# Patient Record
Sex: FEMALE | Race: WHITE | Employment: OTHER
[De-identification: names, ages, dates, MRNs, and addresses within clinical notes are randomized per-mention and may not be internally consistent; named-entity substitution may affect disease eponyms.]

---

## 2024-07-11 ENCOUNTER — OFFICE VISIT (OUTPATIENT)
Dept: FAMILY MEDICINE CLINIC | Facility: CLINIC | Age: 89
End: 2024-07-11
Payer: MEDICARE

## 2024-07-11 VITALS
SYSTOLIC BLOOD PRESSURE: 116 MMHG | WEIGHT: 110.4 LBS | BODY MASS INDEX: 21.68 KG/M2 | OXYGEN SATURATION: 98 % | TEMPERATURE: 97.4 F | HEIGHT: 60 IN | RESPIRATION RATE: 14 BRPM | HEART RATE: 94 BPM | DIASTOLIC BLOOD PRESSURE: 62 MMHG

## 2024-07-11 DIAGNOSIS — M62.81 MUSCLE RIGHT ARM WEAKNESS: ICD-10-CM

## 2024-07-11 DIAGNOSIS — Z13.220 SCREENING FOR LIPOID DISORDERS: ICD-10-CM

## 2024-07-11 DIAGNOSIS — Z85.038 HISTORY OF COLON CANCER: Primary | ICD-10-CM

## 2024-07-11 DIAGNOSIS — R73.03 PREDIABETES: ICD-10-CM

## 2024-07-11 DIAGNOSIS — Z85.828 HISTORY OF SKIN CANCER IN ADULTHOOD: ICD-10-CM

## 2024-07-11 DIAGNOSIS — G89.29 CHRONIC RIGHT SHOULDER PAIN: ICD-10-CM

## 2024-07-11 DIAGNOSIS — E55.9 VITAMIN D DEFICIENCY: ICD-10-CM

## 2024-07-11 DIAGNOSIS — M25.511 CHRONIC RIGHT SHOULDER PAIN: ICD-10-CM

## 2024-07-11 DIAGNOSIS — E83.42 HYPOMAGNESEMIA: ICD-10-CM

## 2024-07-11 PROCEDURE — 1123F ACP DISCUSS/DSCN MKR DOCD: CPT | Performed by: NURSE PRACTITIONER

## 2024-07-11 PROCEDURE — 99204 OFFICE O/P NEW MOD 45 MIN: CPT | Performed by: NURSE PRACTITIONER

## 2024-07-11 RX ORDER — LATANOPROST 50 UG/ML
1 SOLUTION/ DROPS OPHTHALMIC DAILY
COMMUNITY
Start: 2024-06-21

## 2024-07-11 SDOH — ECONOMIC STABILITY: FOOD INSECURITY: WITHIN THE PAST 12 MONTHS, THE FOOD YOU BOUGHT JUST DIDN'T LAST AND YOU DIDN'T HAVE MONEY TO GET MORE.: NEVER TRUE

## 2024-07-11 SDOH — ECONOMIC STABILITY: HOUSING INSECURITY
IN THE LAST 12 MONTHS, WAS THERE A TIME WHEN YOU DID NOT HAVE A STEADY PLACE TO SLEEP OR SLEPT IN A SHELTER (INCLUDING NOW)?: NO

## 2024-07-11 SDOH — ECONOMIC STABILITY: FOOD INSECURITY: WITHIN THE PAST 12 MONTHS, YOU WORRIED THAT YOUR FOOD WOULD RUN OUT BEFORE YOU GOT MONEY TO BUY MORE.: NEVER TRUE

## 2024-07-11 SDOH — ECONOMIC STABILITY: INCOME INSECURITY: HOW HARD IS IT FOR YOU TO PAY FOR THE VERY BASICS LIKE FOOD, HOUSING, MEDICAL CARE, AND HEATING?: NOT HARD AT ALL

## 2024-07-11 ASSESSMENT — PATIENT HEALTH QUESTIONNAIRE - PHQ9
SUM OF ALL RESPONSES TO PHQ QUESTIONS 1-9: 0
SUM OF ALL RESPONSES TO PHQ QUESTIONS 1-9: 0
2. FEELING DOWN, DEPRESSED OR HOPELESS: NOT AT ALL
SUM OF ALL RESPONSES TO PHQ QUESTIONS 1-9: 0
SUM OF ALL RESPONSES TO PHQ9 QUESTIONS 1 & 2: 0
1. LITTLE INTEREST OR PLEASURE IN DOING THINGS: NOT AT ALL
SUM OF ALL RESPONSES TO PHQ QUESTIONS 1-9: 0
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
## 2024-07-12 ENCOUNTER — LAB (OUTPATIENT)
Dept: FAMILY MEDICINE CLINIC | Facility: CLINIC | Age: 89
End: 2024-07-12
Payer: MEDICARE

## 2024-07-12 DIAGNOSIS — R73.03 PREDIABETES: ICD-10-CM

## 2024-07-12 DIAGNOSIS — Z11.59 NEED FOR HEPATITIS C SCREENING TEST: ICD-10-CM

## 2024-07-12 DIAGNOSIS — E55.9 VITAMIN D DEFICIENCY: ICD-10-CM

## 2024-07-12 DIAGNOSIS — E83.42 HYPOMAGNESEMIA: ICD-10-CM

## 2024-07-12 DIAGNOSIS — Z85.038 HISTORY OF COLON CANCER: ICD-10-CM

## 2024-07-12 DIAGNOSIS — Z13.220 SCREENING FOR LIPOID DISORDERS: ICD-10-CM

## 2024-07-12 LAB
25(OH)D3 SERPL-MCNC: 34.8 NG/ML (ref 30–100)
ALBUMIN SERPL-MCNC: 3.7 G/DL (ref 3.2–4.6)
ALBUMIN/GLOB SERPL: 1 (ref 1–1.9)
ALP SERPL-CCNC: 83 U/L (ref 35–104)
ALT SERPL-CCNC: 20 U/L (ref 12–65)
ANION GAP SERPL CALC-SCNC: 10 MMOL/L (ref 9–18)
AST SERPL-CCNC: 28 U/L (ref 15–37)
BASOPHILS # BLD: 0.1 K/UL (ref 0–0.2)
BASOPHILS NFR BLD: 1 % (ref 0–2)
BILIRUB SERPL-MCNC: 0.5 MG/DL (ref 0–1.2)
BILIRUBIN, URINE, POC: NEGATIVE
BLOOD URINE, POC: ABNORMAL
BUN SERPL-MCNC: 25 MG/DL (ref 8–23)
CALCIUM SERPL-MCNC: 9.6 MG/DL (ref 8.8–10.2)
CHLORIDE SERPL-SCNC: 105 MMOL/L (ref 98–107)
CHOLEST SERPL-MCNC: 209 MG/DL (ref 0–200)
CO2 SERPL-SCNC: 26 MMOL/L (ref 20–28)
CREAT SERPL-MCNC: 0.84 MG/DL (ref 0.6–1.1)
DIFFERENTIAL METHOD BLD: ABNORMAL
EOSINOPHIL # BLD: 0.3 K/UL (ref 0–0.8)
EOSINOPHIL NFR BLD: 4 % (ref 0.5–7.8)
ERYTHROCYTE [DISTWIDTH] IN BLOOD BY AUTOMATED COUNT: 14.2 % (ref 11.9–14.6)
EST. AVERAGE GLUCOSE BLD GHB EST-MCNC: 216 MG/DL
GLOBULIN SER CALC-MCNC: 3.7 G/DL (ref 2.3–3.5)
GLUCOSE SERPL-MCNC: 105 MG/DL (ref 70–99)
GLUCOSE URINE, POC: NEGATIVE
HBA1C MFR BLD: 9.1 % (ref 0–5.6)
HCT VFR BLD AUTO: 46.1 % (ref 35.8–46.3)
HDLC SERPL-MCNC: 72 MG/DL (ref 40–60)
HDLC SERPL: 2.9 (ref 0–5)
HGB BLD-MCNC: 14.2 G/DL (ref 11.7–15.4)
IMM GRANULOCYTES # BLD AUTO: 0 K/UL (ref 0–0.5)
IMM GRANULOCYTES NFR BLD AUTO: 0 % (ref 0–5)
KETONES, URINE, POC: NEGATIVE
LDLC SERPL CALC-MCNC: 120 MG/DL (ref 0–100)
LEUKOCYTE ESTERASE, URINE, POC: NEGATIVE
LYMPHOCYTES # BLD: 1.8 K/UL (ref 0.5–4.6)
LYMPHOCYTES NFR BLD: 24 % (ref 13–44)
MAGNESIUM SERPL-MCNC: 1.9 MG/DL (ref 1.8–2.4)
MCH RBC QN AUTO: 29 PG (ref 26.1–32.9)
MCHC RBC AUTO-ENTMCNC: 30.8 G/DL (ref 31.4–35)
MCV RBC AUTO: 94.1 FL (ref 82–102)
MONOCYTES # BLD: 0.8 K/UL (ref 0.1–1.3)
MONOCYTES NFR BLD: 10 % (ref 4–12)
NEUTS SEG # BLD: 4.7 K/UL (ref 1.7–8.2)
NEUTS SEG NFR BLD: 62 % (ref 43–78)
NITRITE, URINE, POC: NEGATIVE
NRBC # BLD: 0 K/UL (ref 0–0.2)
PH, URINE, POC: 6 (ref 4.6–8)
PLATELET # BLD AUTO: 260 K/UL (ref 150–450)
PMV BLD AUTO: 10.1 FL (ref 9.4–12.3)
POTASSIUM SERPL-SCNC: 4.1 MMOL/L (ref 3.5–5.1)
PROT SERPL-MCNC: 7.4 G/DL (ref 6.3–8.2)
PROTEIN,URINE, POC: NEGATIVE
RBC # BLD AUTO: 4.9 M/UL (ref 4.05–5.2)
SODIUM SERPL-SCNC: 141 MMOL/L (ref 136–145)
SPECIFIC GRAVITY, URINE, POC: 1.02 (ref 1–1.03)
TRIGL SERPL-MCNC: 90 MG/DL (ref 0–150)
TSH W FREE THYROID IF ABNORMAL: 1.68 UIU/ML (ref 0.27–4.2)
URINALYSIS CLARITY, POC: ABNORMAL
URINALYSIS COLOR, POC: YELLOW
UROBILINOGEN, POC: NORMAL
VLDLC SERPL CALC-MCNC: 18 MG/DL (ref 6–23)
WBC # BLD AUTO: 7.6 K/UL (ref 4.3–11.1)

## 2024-07-12 PROCEDURE — 81003 URINALYSIS AUTO W/O SCOPE: CPT | Performed by: NURSE PRACTITIONER

## 2024-07-13 DIAGNOSIS — E11.9 TYPE 2 DIABETES MELLITUS WITHOUT COMPLICATION, WITHOUT LONG-TERM CURRENT USE OF INSULIN (HCC): Primary | ICD-10-CM

## 2024-07-13 RX ORDER — GLIPIZIDE AND METFORMIN HCL 2.5; 5 MG/1; MG/1
1 TABLET, FILM COATED ORAL 2 TIMES DAILY WITH MEALS
Qty: 180 TABLET | Refills: 1 | Status: SHIPPED | OUTPATIENT
Start: 2024-07-13

## 2024-07-15 ENCOUNTER — TELEPHONE (OUTPATIENT)
Dept: FAMILY MEDICINE CLINIC | Facility: CLINIC | Age: 89
End: 2024-07-15

## 2024-07-15 DIAGNOSIS — E11.9 TYPE 2 DIABETES MELLITUS WITHOUT COMPLICATION, WITHOUT LONG-TERM CURRENT USE OF INSULIN (HCC): Primary | ICD-10-CM

## 2024-07-15 NOTE — TELEPHONE ENCOUNTER
Spoke with patient regarding lab work and diabetes, she needs a referral to nutritionist to help educate her.

## 2024-07-19 ASSESSMENT — ENCOUNTER SYMPTOMS
RESPIRATORY NEGATIVE: 1
ABDOMINAL DISTENTION: 0
VOMITING: 0
PHOTOPHOBIA: 0
RECTAL PAIN: 0
COUGH: 0
NAUSEA: 0
ALLERGIC/IMMUNOLOGIC NEGATIVE: 1
APNEA: 0
CHOKING: 0
SORE THROAT: 0
DIARRHEA: 0
RHINORRHEA: 0
SINUS PAIN: 0
BLOOD IN STOOL: 0
STRIDOR: 0
WHEEZING: 0
COLOR CHANGE: 0
EYES NEGATIVE: 1
VOICE CHANGE: 0
SHORTNESS OF BREATH: 0
ABDOMINAL PAIN: 0
EYE ITCHING: 0
CHEST TIGHTNESS: 0
CONSTIPATION: 0
EYE REDNESS: 0
SINUS PRESSURE: 0
EYE DISCHARGE: 0
TROUBLE SWALLOWING: 0
BACK PAIN: 0
FACIAL SWELLING: 0
GASTROINTESTINAL NEGATIVE: 1
ANAL BLEEDING: 0
EYE PAIN: 0

## 2024-07-22 ENCOUNTER — TELEPHONE (OUTPATIENT)
Dept: FAMILY MEDICINE CLINIC | Facility: CLINIC | Age: 89
End: 2024-07-22

## 2024-07-22 NOTE — TELEPHONE ENCOUNTER
Patient called stating that she had to stop taking the diabetic medication due to the fact the pharmacist gave her two bottles so she thought she had to take 1 of each bottle, hence causing major stomach up set. She now has an appointment with nutritionalist on the 8/9 and has had some help at [employer] by a [employer] employee taking her around to help her pick healthy selections.  She is requesting a glucometer I let her know that at her next appointment that you and her would talk about it.

## 2024-07-23 RX ORDER — BLOOD-GLUCOSE METER
1 KIT MISCELLANEOUS 2 TIMES DAILY
Qty: 1 KIT | Refills: 0 | Status: SHIPPED | OUTPATIENT
Start: 2024-07-23 | End: 2024-07-23 | Stop reason: SDUPTHER

## 2024-07-23 RX ORDER — BLOOD-GLUCOSE METER
1 KIT MISCELLANEOUS DAILY
Qty: 1 KIT | Refills: 0 | Status: SHIPPED | OUTPATIENT
Start: 2024-07-23

## 2024-07-23 RX ORDER — BLOOD-GLUCOSE METER
1 KIT MISCELLANEOUS 2 TIMES DAILY
COMMUNITY
End: 2024-07-23 | Stop reason: SDUPTHER

## 2024-07-23 RX ORDER — LANCETS 30 GAUGE
1 EACH MISCELLANEOUS DAILY
Qty: 100 EACH | Refills: 5 | Status: SHIPPED | OUTPATIENT
Start: 2024-07-23

## 2024-07-31 DIAGNOSIS — Z85.038 HISTORY OF COLON CANCER: Primary | ICD-10-CM

## 2024-08-01 ENCOUNTER — HOSPITAL ENCOUNTER (OUTPATIENT)
Dept: LAB | Age: 89
Discharge: HOME | End: 2024-08-01
Payer: MEDICARE

## 2024-08-01 ENCOUNTER — OFFICE VISIT (OUTPATIENT)
Dept: ONCOLOGY | Age: 89
End: 2024-08-01

## 2024-08-01 VITALS
HEIGHT: 60 IN | SYSTOLIC BLOOD PRESSURE: 113 MMHG | RESPIRATION RATE: 16 BRPM | BODY MASS INDEX: 21.52 KG/M2 | OXYGEN SATURATION: 98 % | WEIGHT: 109.6 LBS | DIASTOLIC BLOOD PRESSURE: 70 MMHG | HEART RATE: 75 BPM | TEMPERATURE: 97.8 F

## 2024-08-01 DIAGNOSIS — G62.0 CHEMOTHERAPY-INDUCED NEUROPATHY (HCC): ICD-10-CM

## 2024-08-01 DIAGNOSIS — Z85.038 HISTORY OF COLON CANCER: Primary | ICD-10-CM

## 2024-08-01 DIAGNOSIS — T45.1X5A CHEMOTHERAPY-INDUCED NEUROPATHY (HCC): ICD-10-CM

## 2024-08-01 DIAGNOSIS — Z85.038 HISTORY OF COLON CANCER: ICD-10-CM

## 2024-08-01 DIAGNOSIS — R97.0 ELEVATED CEA: ICD-10-CM

## 2024-08-01 LAB
ALBUMIN SERPL-MCNC: 3.6 G/DL (ref 3.2–4.6)
ALBUMIN/GLOB SERPL: 1 (ref 1–1.9)
ALP SERPL-CCNC: 79 U/L (ref 35–104)
ALT SERPL-CCNC: 28 U/L (ref 12–65)
ANION GAP SERPL CALC-SCNC: 10 MMOL/L (ref 9–18)
AST SERPL-CCNC: 35 U/L (ref 15–37)
BASOPHILS # BLD: 0.1 K/UL (ref 0–0.2)
BASOPHILS NFR BLD: 1 % (ref 0–2)
BILIRUB SERPL-MCNC: 0.3 MG/DL (ref 0–1.2)
BUN SERPL-MCNC: 36 MG/DL (ref 8–23)
CALCIUM SERPL-MCNC: 9.4 MG/DL (ref 8.8–10.2)
CEA SERPL-MCNC: 3.9 NG/ML (ref 0–3.8)
CHLORIDE SERPL-SCNC: 103 MMOL/L (ref 98–107)
CO2 SERPL-SCNC: 26 MMOL/L (ref 20–28)
CREAT SERPL-MCNC: 0.79 MG/DL (ref 0.6–1.1)
DIFFERENTIAL METHOD BLD: ABNORMAL
EOSINOPHIL # BLD: 0.2 K/UL (ref 0–0.8)
EOSINOPHIL NFR BLD: 2 % (ref 0.5–7.8)
ERYTHROCYTE [DISTWIDTH] IN BLOOD BY AUTOMATED COUNT: 13.8 % (ref 11.9–14.6)
GLOBULIN SER CALC-MCNC: 3.6 G/DL (ref 2.3–3.5)
GLUCOSE SERPL-MCNC: 101 MG/DL (ref 70–99)
HCT VFR BLD AUTO: 43.4 % (ref 35.8–46.3)
HGB BLD-MCNC: 14.3 G/DL (ref 11.7–15.4)
IMM GRANULOCYTES # BLD AUTO: 0 K/UL (ref 0–0.5)
IMM GRANULOCYTES NFR BLD AUTO: 0 % (ref 0–5)
LYMPHOCYTES # BLD: 1.8 K/UL (ref 0.5–4.6)
LYMPHOCYTES NFR BLD: 19 % (ref 13–44)
MCH RBC QN AUTO: 29.6 PG (ref 26.1–32.9)
MCHC RBC AUTO-ENTMCNC: 32.9 G/DL (ref 31.4–35)
MCV RBC AUTO: 89.9 FL (ref 82–102)
MONOCYTES # BLD: 0.8 K/UL (ref 0.1–1.3)
MONOCYTES NFR BLD: 9 % (ref 4–12)
NEUTS SEG # BLD: 6.5 K/UL (ref 1.7–8.2)
NEUTS SEG NFR BLD: 69 % (ref 43–78)
NRBC # BLD: 0 K/UL (ref 0–0.2)
PLATELET # BLD AUTO: 249 K/UL (ref 150–450)
PMV BLD AUTO: 9.3 FL (ref 9.4–12.3)
POTASSIUM SERPL-SCNC: 4.8 MMOL/L (ref 3.5–5.1)
PROT SERPL-MCNC: 7.3 G/DL (ref 6.3–8.2)
RBC # BLD AUTO: 4.83 M/UL (ref 4.05–5.2)
SODIUM SERPL-SCNC: 139 MMOL/L (ref 136–145)
WBC # BLD AUTO: 9.3 K/UL (ref 4.3–11.1)

## 2024-08-01 PROCEDURE — 85025 COMPLETE CBC W/AUTO DIFF WBC: CPT

## 2024-08-01 PROCEDURE — 80053 COMPREHEN METABOLIC PANEL: CPT

## 2024-08-01 PROCEDURE — 82378 CARCINOEMBRYONIC ANTIGEN: CPT

## 2024-08-01 PROCEDURE — 36415 COLL VENOUS BLD VENIPUNCTURE: CPT

## 2024-08-01 ASSESSMENT — PATIENT HEALTH QUESTIONNAIRE - PHQ9
SUM OF ALL RESPONSES TO PHQ9 QUESTIONS 1 & 2: 0
1. LITTLE INTEREST OR PLEASURE IN DOING THINGS: NOT AT ALL
SUM OF ALL RESPONSES TO PHQ QUESTIONS 1-9: 0
2. FEELING DOWN, DEPRESSED OR HOPELESS: NOT AT ALL
SUM OF ALL RESPONSES TO PHQ QUESTIONS 1-9: 0

## 2024-08-01 NOTE — PROGRESS NOTES
NEW PATIENT INTAKE    Referral Diagnosis: History of colon cancer      Referring Provider: Pam Woods APRN - CNP    Primary Care Provider: Pam Woods APRN - CNP    Family History of Cancer/ Hematology Disorders: Father with prostate cancer    Presenting Symptoms: History of colon cancer    Chronological History of Pertinent Events:     89yo white female    PMH: Colonoscopy (5/2011); Colon-polyp removed (6/2011); Hysterectomy (1979)     (6/30/11 - [facility])        7/7/11 - Met with Oncologist, Asa Contreras MD at [facility] ([facility])  New patient for consultation of recently found colon cancer. Positive stools, underwent a colonoscopy, found a flat polyp located in the cecum.  Underwent robotic-assisted right colectomy. 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 No lymphovascular invasion. No perineural invasion. All 32 lymph nodes were negative, T3N0M0. No family history of colon cancer.  Assessment/Plan:  T3N0M0, colon cancer, moderately differentiated, s/p resection.   Do no think she will require adjuvant therapy.  Will get a CEA and LFT as an outpatient, also baseline CAT scans.  Do think patient is an excellent candidate for trial looking at statin vs. placebo and to prevent colon cancer recurrence in a patient with early-stage disease.  Patient is interested. Will plan on seeing as an outpatient in 3 weeks.        (6/12/12 - [facility])      7/3/12 - Met with Oncologist ([facility])  Dx: Colon cancer, moderately differentiated T3N0 s/p hysterectomy  Had been on clinical trial, statin vs placebo. Has withdrawn.  Here for f/u today. Denies any new complaints.  Took her oral iron briefly but stopped due to “aggravating her stomach.”  Assessment  Dx: colon cancer s/p right hemicolectomy, now JUVE  s/p surgery. Did not require any adjuvant therapy.  Will check CBC, iron

## 2024-08-01 NOTE — PROGRESS NOTES
139 136 - 145 mmol/L    Potassium 4.8 3.5 - 5.1 mmol/L    Chloride 103 98 - 107 mmol/L    CO2 26 20 - 28 mmol/L    Anion Gap 10 9 - 18 mmol/L    Glucose 101 (H) 70 - 99 mg/dL    BUN 36 (H) 8 - 23 MG/DL    Creatinine 0.79 0.60 - 1.10 MG/DL    Est, Glom Filt Rate 73 >60 ml/min/1.73m2    Calcium 9.4 8.8 - 10.2 MG/DL    Total Bilirubin 0.3 0.0 - 1.2 MG/DL    ALT 28 12 - 65 U/L    AST 35 15 - 37 U/L    Alk Phosphatase 79 35 - 104 U/L    Total Protein 7.3 6.3 - 8.2 g/dL    Albumin 3.6 3.2 - 4.6 g/dL    Globulin 3.6 (H) 2.3 - 3.5 g/dL    Albumin/Globulin Ratio 1.0 1.0 - 1.9     CBC with Auto Differential    Collection Time: 08/01/24  1:09 PM   Result Value Ref Range    WBC 9.3 4.3 - 11.1 K/uL    RBC 4.83 4.05 - 5.2 M/uL    Hemoglobin 14.3 11.7 - 15.4 g/dL    Hematocrit 43.4 35.8 - 46.3 %    MCV 89.9 82.0 - 102.0 FL    MCH 29.6 26.1 - 32.9 PG    MCHC 32.9 31.4 - 35.0 g/dL    RDW 13.8 11.9 - 14.6 %    Platelets 249 150 - 450 K/uL    MPV 9.3 (L) 9.4 - 12.3 FL    nRBC 0.00 0.0 - 0.2 K/uL    Neutrophils % 69 43 - 78 %    Lymphocytes % 19 13 - 44 %    Monocytes % 9 4.0 - 12.0 %    Eosinophils % 2 0.5 - 7.8 %    Basophils % 1 0.0 - 2.0 %    Immature Granulocytes % 0 0.0 - 5.0 %    Neutrophils Absolute 6.5 1.7 - 8.2 K/UL    Lymphocytes Absolute 1.8 0.5 - 4.6 K/UL    Monocytes Absolute 0.8 0.1 - 1.3 K/UL    Eosinophils Absolute 0.2 0.0 - 0.8 K/UL    Basophils Absolute 0.1 0.0 - 0.2 K/UL    Immature Granulocytes Absolute 0.0 0.0 - 0.5 K/UL    Differential Type AUTOMATED         Imaging:  No results found for this or any previous visit.    ASSESSMENT/PLAN:   Diagnosis Orders   1. History of colon cancer  CBC with Auto Differential    CEA    Comprehensive Metabolic Panel      2. Elevated CEA        3. Chemotherapy-induced neuropathy (HCC)          88 y.o. F consulted for colon cancer presented to [facility] 8/1/2024.  She was diagnosed of T3 N0 M0 colon cancer status post hemicolectomy in 2011 without adjuvant therapy, developed liver

## 2024-08-01 NOTE — PATIENT INSTRUCTIONS
Patient Information from Today's Visit    The members of your [facility] are listed below:    Physician Provider: Antony Stallings Medical Oncologist  Advanced Practice Clinician: Aury Linda NP  Registered Nurse: Madalyn JJ   Navigator: Suzy BENSON RN  Medical Assistant: Huyen GALVEZ MA  : Riddhi CHOWDARY   Supportive Care Services: Daisy MOORE LMSW    Diagnosis: History of Colon Cancer      Follow Up Instructions:   - We will see you back in 6 months with labs      Treatment Summary has been discussed and given to patient:No      Current Labs:   Hospital Outpatient Visit on 08/01/2024   Component Date Value Ref Range Status    CEA 08/01/2024 3.9 (H)  0.0 - 3.8 ng/mL Final    Comment: Nonsmoker: <3.9 ng/mL  Smoker: <5.6 ng/mL  Roche ECLIA methodology  Patient's results of tumor marker testing may not be comparable to labs using different manufacturers/methods.      Sodium 08/01/2024 139  136 - 145 mmol/L Final    Potassium 08/01/2024 4.8  3.5 - 5.1 mmol/L Final    Chloride 08/01/2024 103  98 - 107 mmol/L Final    CO2 08/01/2024 26  20 - 28 mmol/L Final    Anion Gap 08/01/2024 10  9 - 18 mmol/L Final    Glucose 08/01/2024 101 (H)  70 - 99 mg/dL Final    Comment: <70 mg/dL Consistent with, but not fully diagnostic of hypoglycemia.  100 - 125 mg/dL Impaired fasting glucose/consistent with pre-diabetes mellitus.  > 126 mg/dl Fasting glucose consistent with overt diabetes mellitus      BUN 08/01/2024 36 (H)  8 - 23 MG/DL Final    Creatinine 08/01/2024 0.79  0.60 - 1.10 MG/DL Final    Est, Glom Filt Rate 08/01/2024 73  >60 ml/min/1.73m2 Final    Comment:    Pediatric calculator link: https://www.kidney.org/professionals/kdoqi/gfr_calculatorped     These results are not intended for use in patients <18 years of age.     eGFR results are calculated without a race factor using  the 2021 CKD-EPI equation. Careful clinical correlation is recommended, particularly when comparing to results calculated using

## 2024-08-05 ENCOUNTER — CLINICAL DOCUMENTATION (OUTPATIENT)
Dept: CASE MANAGEMENT | Age: 89
End: 2024-08-05

## 2024-08-05 NOTE — PROGRESS NOTES
Called office of Dr Asa Contreras in [facility] and requested last office note and scans from his office be faxed to us. Faxed request to [phone]

## 2024-08-09 ENCOUNTER — HOSPITAL ENCOUNTER (OUTPATIENT)
Dept: NUTRITION | Age: 89
Setting detail: RECURRING SERIES
Discharge: HOME | End: 2024-08-12
Payer: MEDICARE

## 2024-08-09 PROCEDURE — 97802 MEDICAL NUTRITION INDIV IN: CPT

## 2024-08-09 NOTE — PROGRESS NOTES
Eating pattern appears inadequate in carbohydrate and fiber.      Patient stated goal: help with maintaining weight, and blood sugars.    Eating behaviors and factors impacting food choices:   -Confusion on nutrition advice  -food fear    Stage of Change: Action.    Anthropometrics:   Estimated body mass index is 21.4 kg/m² as calculated from the following:    Height as of 8/1/24: 1.524 m (5').    Weight as of 8/1/24: 49.7 kg (109 lb 9.6 oz).     Estimated Nutrition Needs:  MSJ x 1.4= 1200 kcal/d          Protein: 60 g/day (20%)  30g CHO per meal/ 15g protein per meal / 5g fiber x 5 mini meals daily     DIAGNOSIS:  Unbalanced diet related to nutrition knowledge deficit as evidenced by eating pattern as above.     INTERVENTION:  RD Goal: Improved food variety  RD Goal: Achieve glycemic control  RD Goal: 1-2# weight gain/week  RD Goal: Maintain CBW    Goal for Diabetes MNT: Decrease complications from diabetes and CVD risk by intensive lifestyle modification specifically geared towards healthier food choices,     Use MyPlate guide to compose structured meals (1/2 plate non starchy vegetables, 1/4 plate lean protein, 1/4 plate starchy vegetable or whole grain)  Track meals for 7 days. Check and record fasting, and 2hr PPBG during this time.  Aim for 30 minutes of movement everyday.     Nutrition Education and Counseling (65 minutes):  Reviewed MNT Guidelines for DM weight stabilization .   Discussed effects of foods/beverages on metabolic parameters (blood pressure, glucose, lipids, blood pressure and weight). Encouraged structured eating: planning ahead for macronutrient balanced meals and snacks, consistent CHO intake according to prescribed meal pattern, not skipping meals, and designated times for meals/snacks.    Encouraged reducing overall carbohydrate intake from baseline, choosing low glycemic, fiber rich foods and complex carbohydrate sources, lean protein sources, and heart healthy fats, and limiting

## 2024-08-19 ENCOUNTER — OFFICE VISIT (OUTPATIENT)
Dept: FAMILY MEDICINE CLINIC | Facility: CLINIC | Age: 89
End: 2024-08-19
Payer: MEDICARE

## 2024-08-19 VITALS
WEIGHT: 108.8 LBS | TEMPERATURE: 97.6 F | RESPIRATION RATE: 14 BRPM | OXYGEN SATURATION: 95 % | HEART RATE: 80 BPM | SYSTOLIC BLOOD PRESSURE: 112 MMHG | BODY MASS INDEX: 21.36 KG/M2 | DIASTOLIC BLOOD PRESSURE: 64 MMHG | HEIGHT: 60 IN

## 2024-08-19 DIAGNOSIS — E78.5 HYPERLIPIDEMIA LDL GOAL <70: ICD-10-CM

## 2024-08-19 DIAGNOSIS — E11.65 TYPE 2 DIABETES MELLITUS WITH HYPERGLYCEMIA, WITHOUT LONG-TERM CURRENT USE OF INSULIN (HCC): ICD-10-CM

## 2024-08-19 DIAGNOSIS — Z00.00 ENCOUNTER FOR ANNUAL WELLNESS VISIT (AWV) IN MEDICARE PATIENT: Primary | ICD-10-CM

## 2024-08-19 DIAGNOSIS — E55.9 VITAMIN D DEFICIENCY: ICD-10-CM

## 2024-08-19 PROCEDURE — G0439 PPPS, SUBSEQ VISIT: HCPCS | Performed by: NURSE PRACTITIONER

## 2024-08-19 PROCEDURE — 3046F HEMOGLOBIN A1C LEVEL >9.0%: CPT | Performed by: NURSE PRACTITIONER

## 2024-08-19 PROCEDURE — 1123F ACP DISCUSS/DSCN MKR DOCD: CPT | Performed by: NURSE PRACTITIONER

## 2024-08-19 ASSESSMENT — ENCOUNTER SYMPTOMS
NAUSEA: 0
TROUBLE SWALLOWING: 0
ALLERGIC/IMMUNOLOGIC NEGATIVE: 1
EYE DISCHARGE: 0
BACK PAIN: 0
EYE REDNESS: 0
VOMITING: 0
CHOKING: 0
ABDOMINAL PAIN: 0
COLOR CHANGE: 0
BLOOD IN STOOL: 0
APNEA: 0
DIARRHEA: 0
PHOTOPHOBIA: 0
SORE THROAT: 0
GASTROINTESTINAL NEGATIVE: 1
SHORTNESS OF BREATH: 0
ABDOMINAL DISTENTION: 0
RESPIRATORY NEGATIVE: 1
SINUS PAIN: 0
STRIDOR: 0
VOICE CHANGE: 0
EYES NEGATIVE: 1
WHEEZING: 0
FACIAL SWELLING: 0
EYE ITCHING: 0
CONSTIPATION: 0
RHINORRHEA: 0
EYE PAIN: 0
COUGH: 0
CHEST TIGHTNESS: 0
RECTAL PAIN: 0
SINUS PRESSURE: 0
ANAL BLEEDING: 0

## 2024-08-19 ASSESSMENT — LIFESTYLE VARIABLES
HOW MANY STANDARD DRINKS CONTAINING ALCOHOL DO YOU HAVE ON A TYPICAL DAY: PATIENT DOES NOT DRINK
HOW OFTEN DO YOU HAVE A DRINK CONTAINING ALCOHOL: NEVER

## 2024-08-19 ASSESSMENT — PATIENT HEALTH QUESTIONNAIRE - PHQ9
SUM OF ALL RESPONSES TO PHQ QUESTIONS 1-9: 0
SUM OF ALL RESPONSES TO PHQ9 QUESTIONS 1 & 2: 0
2. FEELING DOWN, DEPRESSED OR HOPELESS: NOT AT ALL
1. LITTLE INTEREST OR PLEASURE IN DOING THINGS: NOT AT ALL

## 2024-08-19 NOTE — PROGRESS NOTES
urgency.   Musculoskeletal: Negative.  Negative for arthralgias, back pain, gait problem, joint swelling, myalgias, neck pain and neck stiffness.   Skin: Negative.  Negative for color change, pallor, rash and wound.   Allergic/Immunologic: Negative.  Negative for environmental allergies, food allergies and immunocompromised state.   Neurological: Negative.  Negative for dizziness, tremors, seizures, syncope, facial asymmetry, speech difficulty, weakness, light-headedness, numbness and headaches.   Hematological: Negative.  Negative for adenopathy. Does not bruise/bleed easily.   Psychiatric/Behavioral: Negative.  Negative for agitation, behavioral problems, confusion, decreased concentration, dysphoric mood, hallucinations, self-injury, sleep disturbance and suicidal ideas. The patient is not nervous/anxious and is not hyperactive.        OBJECTIVE:  /64 (Site: Left Upper Arm, Position: Sitting, Cuff Size: Small Adult)   Pulse 80   Temp 97.6 °F (36.4 °C) (Tympanic)   Resp 14   Ht 1.524 m (5')   Wt 49.4 kg (108 lb 12.8 oz)   SpO2 95%   BMI 21.25 kg/m²      Physical Exam  Constitutional:       General: She is not in acute distress.     Appearance: Normal appearance. She is not ill-appearing, toxic-appearing or diaphoretic.   HENT:      Head: Normocephalic and atraumatic.      Right Ear: Tympanic membrane, ear canal and external ear normal. There is no impacted cerumen.      Left Ear: Tympanic membrane, ear canal and external ear normal. There is no impacted cerumen.      Nose: Nose normal.      Mouth/Throat:      Mouth: Mucous membranes are moist.      Pharynx: Oropharynx is clear.   Eyes:      Extraocular Movements: Extraocular movements intact.      Conjunctiva/sclera: Conjunctivae normal.      Pupils: Pupils are equal, round, and reactive to light.   Neck:      Vascular: No carotid bruit.   Cardiovascular:      Rate and Rhythm: Normal rate and regular rhythm.      Pulses: Normal pulses.      Heart

## 2024-09-23 ENCOUNTER — LAB (OUTPATIENT)
Dept: FAMILY MEDICINE CLINIC | Facility: CLINIC | Age: 89
End: 2024-09-23

## 2024-09-23 DIAGNOSIS — E11.65 TYPE 2 DIABETES MELLITUS WITH HYPERGLYCEMIA, WITHOUT LONG-TERM CURRENT USE OF INSULIN (HCC): ICD-10-CM

## 2024-09-23 DIAGNOSIS — E55.9 VITAMIN D DEFICIENCY: ICD-10-CM

## 2024-09-23 LAB
25(OH)D3 SERPL-MCNC: 35.8 NG/ML (ref 30–100)
ALBUMIN SERPL-MCNC: 3.7 G/DL (ref 3.2–4.6)
ALBUMIN/GLOB SERPL: 1.1 (ref 1–1.9)
ALP SERPL-CCNC: 78 U/L (ref 35–104)
ALT SERPL-CCNC: 30 U/L (ref 12–65)
ANION GAP SERPL CALC-SCNC: 9 MMOL/L (ref 9–18)
AST SERPL-CCNC: 28 U/L (ref 15–37)
BASOPHILS # BLD: 0.1 K/UL (ref 0–0.2)
BASOPHILS NFR BLD: 1 % (ref 0–2)
BILIRUB SERPL-MCNC: 0.5 MG/DL (ref 0–1.2)
BUN SERPL-MCNC: 29 MG/DL (ref 8–23)
CALCIUM SERPL-MCNC: 9.7 MG/DL (ref 8.8–10.2)
CHLORIDE SERPL-SCNC: 104 MMOL/L (ref 98–107)
CHOLEST SERPL-MCNC: 196 MG/DL (ref 0–200)
CO2 SERPL-SCNC: 27 MMOL/L (ref 20–28)
CREAT SERPL-MCNC: 0.84 MG/DL (ref 0.6–1.1)
DIFFERENTIAL METHOD BLD: ABNORMAL
EOSINOPHIL # BLD: 0.3 K/UL (ref 0–0.8)
EOSINOPHIL NFR BLD: 4 % (ref 0.5–7.8)
ERYTHROCYTE [DISTWIDTH] IN BLOOD BY AUTOMATED COUNT: 14.1 % (ref 11.9–14.6)
EST. AVERAGE GLUCOSE BLD GHB EST-MCNC: 126 MG/DL
GLOBULIN SER CALC-MCNC: 3.4 G/DL (ref 2.3–3.5)
GLUCOSE SERPL-MCNC: 108 MG/DL (ref 70–99)
HBA1C MFR BLD: 6 % (ref 0–5.6)
HCT VFR BLD AUTO: 46.5 % (ref 35.8–46.3)
HDLC SERPL-MCNC: 62 MG/DL (ref 40–60)
HDLC SERPL: 3.2 (ref 0–5)
HGB BLD-MCNC: 14.5 G/DL (ref 11.7–15.4)
IMM GRANULOCYTES # BLD AUTO: 0 K/UL (ref 0–0.5)
IMM GRANULOCYTES NFR BLD AUTO: 0 % (ref 0–5)
LDLC SERPL CALC-MCNC: 117 MG/DL (ref 0–100)
LYMPHOCYTES # BLD: 1.8 K/UL (ref 0.5–4.6)
LYMPHOCYTES NFR BLD: 26 % (ref 13–44)
MCH RBC QN AUTO: 29 PG (ref 26.1–32.9)
MCHC RBC AUTO-ENTMCNC: 31.2 G/DL (ref 31.4–35)
MCV RBC AUTO: 93 FL (ref 82–102)
MONOCYTES # BLD: 0.7 K/UL (ref 0.1–1.3)
MONOCYTES NFR BLD: 10 % (ref 4–12)
NEUTS SEG # BLD: 4 K/UL (ref 1.7–8.2)
NEUTS SEG NFR BLD: 59 % (ref 43–78)
NRBC # BLD: 0 K/UL (ref 0–0.2)
PLATELET # BLD AUTO: 254 K/UL (ref 150–450)
PMV BLD AUTO: 10.4 FL (ref 9.4–12.3)
POTASSIUM SERPL-SCNC: 4.6 MMOL/L (ref 3.5–5.1)
PROT SERPL-MCNC: 7.1 G/DL (ref 6.3–8.2)
RBC # BLD AUTO: 5 M/UL (ref 4.05–5.2)
SODIUM SERPL-SCNC: 140 MMOL/L (ref 136–145)
TRIGL SERPL-MCNC: 89 MG/DL (ref 0–150)
TSH W FREE THYROID IF ABNORMAL: 1.87 UIU/ML (ref 0.27–4.2)
VLDLC SERPL CALC-MCNC: 18 MG/DL (ref 6–23)
WBC # BLD AUTO: 6.8 K/UL (ref 4.3–11.1)

## 2024-10-02 ENCOUNTER — HOSPITAL ENCOUNTER (OUTPATIENT)
Dept: NUTRITION | Age: 89
Discharge: HOME | End: 2024-10-05
Payer: MEDICARE

## 2024-10-02 PROCEDURE — 97803 MED NUTRITION INDIV SUBSEQ: CPT

## 2024-10-02 NOTE — PROGRESS NOTES
Qi Victoria MS RD LD, [facility]  Outpatient Registered Dietitian  [facility]  Phone: [phone] Fax: [phone]       Pt is a 88 y.o. female referred with the following diagnosis (es): Type 2 diabetes mellitus without complications [E11.9]     10/2/2024 Follow up assessment   Luisa comes in today to follow up on her nutrition goals.   In office weight 107.8 pounds, stable since last assessment. A1C has improved since last appointment from 9.1 to 6.0. TC has also improved. Feeling very well today. Diabetes medication d/c. Sees PCP tomorrow.     Maintain social and movement activities including chair yoga, bridge, and [facility] balance exercise.     Blood glucose monitoring: checking fasting BG daily, and occasionally PPBG. Fasting ranges between 80's -110, no events of hypoglycemia recorded. Reports trouble with fingerstick unable to access enough blood.    RD adjusted gauge size today.     Diet recall:  - written recall reviewed,   - using meal planning resources to measure out carbohydrates, finds it fun to do.   - consuming 3 meals and 2-3 snacks daily.  - incorporating protein food with each meal and snack.  - consuming ensure 9g protein/ 250 calories as 1 snack daily.   - states that sometimes she deals with loose stools, takes imodium 1-2x per week. Has been like this for years.       Labs:  9/23/2024:   A1C= 6.0 (within acceptable limits)  TC= 196 (down from 209)  LDLc 117 (down from 120)     Intervention (45 min)  - reviewed food log, reviewed adequacy, and provided suggestions for improvements based on weight gain/blood sugars goals.  - demonstrated feature of lancet, instructions given on how to adjust gauge size. Set at 5 today.   - questions answered.     Goal:   Action Steps:   Use MyPlate guide to compose structured meals (1/2 plate non starchy vegetables, 1/4 plate lean protein, 1/4 plate starchy vegetable or whole grain).  30g fiber rich carbohydrates 15-20g protein

## 2024-10-03 ENCOUNTER — OFFICE VISIT (OUTPATIENT)
Dept: FAMILY MEDICINE CLINIC | Facility: CLINIC | Age: 89
End: 2024-10-03

## 2024-10-03 VITALS
RESPIRATION RATE: 14 BRPM | HEART RATE: 80 BPM | HEIGHT: 60 IN | TEMPERATURE: 97.2 F | OXYGEN SATURATION: 99 % | BODY MASS INDEX: 21.2 KG/M2 | WEIGHT: 108 LBS | DIASTOLIC BLOOD PRESSURE: 68 MMHG | SYSTOLIC BLOOD PRESSURE: 120 MMHG

## 2024-10-03 DIAGNOSIS — E55.9 VITAMIN D DEFICIENCY: ICD-10-CM

## 2024-10-03 DIAGNOSIS — E11.9 DIET-CONTROLLED TYPE 2 DIABETES MELLITUS (HCC): Primary | ICD-10-CM

## 2024-10-03 DIAGNOSIS — E78.5 HYPERLIPIDEMIA LDL GOAL <70: ICD-10-CM

## 2024-10-03 ASSESSMENT — PATIENT HEALTH QUESTIONNAIRE - PHQ9
SUM OF ALL RESPONSES TO PHQ QUESTIONS 1-9: 0
SUM OF ALL RESPONSES TO PHQ QUESTIONS 1-9: 0
1. LITTLE INTEREST OR PLEASURE IN DOING THINGS: NOT AT ALL
2. FEELING DOWN, DEPRESSED OR HOPELESS: NOT AT ALL
SUM OF ALL RESPONSES TO PHQ QUESTIONS 1-9: 0
SUM OF ALL RESPONSES TO PHQ QUESTIONS 1-9: 0
SUM OF ALL RESPONSES TO PHQ9 QUESTIONS 1 & 2: 0

## 2024-10-03 NOTE — PROGRESS NOTES
D 25 Hydroxy     Standing Status:   Future     Standing Expiration Date:   10/3/2025    Hemoglobin A1C     Standing Status:   Future     Standing Expiration Date:   10/3/2025    Lipid Panel     Standing Status:   Future     Standing Expiration Date:   10/3/2025    Comprehensive Metabolic Panel     Standing Status:   Future     Standing Expiration Date:   10/3/2025    TSH with Reflex     Standing Status:   Future     Standing Expiration Date:   10/3/2025    CBC with Auto Differential     Standing Status:   Future     Standing Expiration Date:   10/3/2025    Microalbumin / Creatinine Urine Ratio     Standing Status:   Future     Standing Expiration Date:   10/3/2025         Elements of this note have been dictated using speech recognition software. As a result, errors of speech recognition may have occurred.      On this date 10/3/2024 I have spent 35 minutes reviewing previous notes, test results and face to face with the patient discussing the diagnosis and importance of compliance with the treatment plan as well as documenting on the day of the visit. Greater than 50% of this visit was spent counseling the patient about test results, prognosis, importance of compliance, education about disease process, benefits of medications, instructions for management of acute symptoms, and follow up plans.    Return in about 6 months (around 4/3/2025) for Follow up with fasting labs prior.     Pam Woods, APRN - CNP

## 2024-11-12 ENCOUNTER — TELEPHONE (OUTPATIENT)
Dept: PHARMACY | Facility: CLINIC | Age: 89
End: 2024-11-12

## 2024-11-12 NOTE — TELEPHONE ENCOUNTER
[facility] CLINICAL PHARMACY: ADHERENCE REVIEW  Identified care gap per [employer]: fills at [facility]: Diabetes adherence    ASSESSMENT  DIABETES ADHERENCE    Insurance Records claims through 10/18/2024 (Prior Year PDC = not reported; YTD PDC = FIRST FILL; Potential Fail Date: 24):   Glipizide-metformin last filled on 24 for 90 day supply. Next refill due: 10/11/24    Per medication history discontinued 24 by PCP -  with reason \"list cleanup,\" note from PCP that pt wants to do healthy diet and lifestyle changes    Most recent OV 10/3/24 - A1c decreased and encouraged to continue healthy dietary changes    Prescribed si tablet/capsule twice daily but discontinued; no ecancel status from pharmacy want to ensure deactivated    Per Reconcile Dispense History:   GLIPIZIDE/METFORMIN HYDROCHLORIDE  2.5-500 MG TABS 2024 90 180 tablet Pam Woods, ALDAIR - CNP   Last Rx 7/13/24 x 90 day supply 1 refill - may still have refill at pharmacy    Per [facility]: confirmed Rx has been deactivated at pharmacy    Lab Results   Component Value Date    LABA1C 6.0 (H) 2024    LABA1C 9.1 (H) 2024     NOTE: A1c <9%    PLAN    No patient outreach planned at this time.    Maria C Glover, PharmD, [facility]  Population Health Pharmacist  [facility]  Department, toll free: [phone], option 1     For Pharmacy Admin Tracking Only    Program: [facility]  CPA in place:  No  Recommendation Provided To: Pharmacy: 1  Intervention Detail: Discontinued Rx: 1, reason: Therapy Complete  Intervention Accepted By: Pharmacy: 1  Gap Closed?: Yes   Time Spent (min): 15

## 2024-12-11 ENCOUNTER — HOSPITAL ENCOUNTER (OUTPATIENT)
Dept: NUTRITION | Age: 89
Setting detail: RECURRING SERIES
Discharge: HOME | End: 2024-12-14
Payer: MEDICARE

## 2024-12-11 PROCEDURE — 97803 MED NUTRITION INDIV SUBSEQ: CPT

## 2024-12-11 NOTE — PROGRESS NOTES
sauce  Snack: 20 gold fish  D: rotisserie chicken with noodles, and corn.   Beverages: water.        Intervention (45 minutes):  Nutrition Supplementation:   - provided recommendations on nutrition supplements. Ensure plus for snack is preferred. Coupons and samples provided.  - reviewed protein/carbohydrate snack options, built healthy snacks together. Written snack list provided.    Nutrition Education   - Encouraged to consume 15g carbohydrate/7-10g protein snacks between meals. Encouraged 225-300 calorie snacks, list provided. Soup recipe provided today.   - reviewed ADA blood pressure goals.     New Nutrition Goals for next visit.   Continue to Use MyPlate guide to compose structured meals (1/2 plate non starchy vegetables, 1/4 plate lean protein, 1/4 plate starchy vegetable or whole grain).  30g fiber rich carbohydrates 15-20g protein per meal  Ensure or boost as 1 snack per day (220 calories/7-13g protein)   Consume 2-3 snacks daily  Consume protein food with each carbohydrate snack (use snack list).   Fasting aim for  given advanced age; 1-2 hours 130-150 post prandial.    Instructed to call RD office if fasting BG <80 or >200 for several reading in a week     MONITORING & EVALUATION:  Monitor Food and Nutrient Intake, Anthropometrics, and Biochemical  Follow Up: 8 weeks or soon if needed.  __________________________________________  10/2/2024 Follow up assessment   Luisa comes in today to follow up on her nutrition goals.   In office weight 107.8 pounds, stable since last assessment. A1C has improved since last appointment from 9.1 to 6.0. TC has also improved. Feeling very well today. Diabetes medication d/c. Sees PCP tomorrow.      Maintain social and movement activities including chair yoga, bridge, and [facility] balance exercise.      Blood glucose monitoring: checking fasting BG daily, and occasionally PPBG. Fasting ranges between 80's -110, no events of hypoglycemia recorded. Reports trouble

## 2025-02-11 ENCOUNTER — HOSPITAL ENCOUNTER (OUTPATIENT)
Dept: LAB | Age: 89
Discharge: HOME | End: 2025-02-11
Payer: MEDICARE

## 2025-02-11 ENCOUNTER — OFFICE VISIT (OUTPATIENT)
Dept: ONCOLOGY | Age: 89
End: 2025-02-11

## 2025-02-11 VITALS
TEMPERATURE: 97.7 F | RESPIRATION RATE: 16 BRPM | OXYGEN SATURATION: 95 % | BODY MASS INDEX: 21.2 KG/M2 | HEIGHT: 60 IN | WEIGHT: 108 LBS | SYSTOLIC BLOOD PRESSURE: 124 MMHG | DIASTOLIC BLOOD PRESSURE: 73 MMHG | HEART RATE: 62 BPM

## 2025-02-11 DIAGNOSIS — R97.0 ELEVATED CEA: ICD-10-CM

## 2025-02-11 DIAGNOSIS — Z85.038 HISTORY OF COLON CANCER: Primary | ICD-10-CM

## 2025-02-11 DIAGNOSIS — Z85.038 HISTORY OF COLON CANCER: ICD-10-CM

## 2025-02-11 LAB
ALBUMIN SERPL-MCNC: 3.7 G/DL (ref 3.2–4.6)
ALBUMIN/GLOB SERPL: 0.8 (ref 1–1.9)
ALP SERPL-CCNC: 101 U/L (ref 35–104)
ALT SERPL-CCNC: 45 U/L (ref 8–45)
ANION GAP SERPL CALC-SCNC: 14 MMOL/L (ref 7–16)
AST SERPL-CCNC: 54 U/L (ref 15–37)
BASOPHILS # BLD: 0.06 K/UL (ref 0–0.2)
BASOPHILS NFR BLD: 0.6 % (ref 0–2)
BILIRUB SERPL-MCNC: 0.3 MG/DL (ref 0–1.2)
BUN SERPL-MCNC: 39 MG/DL (ref 8–23)
CALCIUM SERPL-MCNC: 9.1 MG/DL (ref 8.8–10.2)
CEA SERPL-MCNC: 4.3 NG/ML (ref 0–3.8)
CHLORIDE SERPL-SCNC: 103 MMOL/L (ref 98–107)
CO2 SERPL-SCNC: 23 MMOL/L (ref 20–29)
CREAT SERPL-MCNC: 0.88 MG/DL (ref 0.6–1.1)
DIFFERENTIAL METHOD BLD: ABNORMAL
EOSINOPHIL # BLD: 0.26 K/UL (ref 0–0.8)
EOSINOPHIL NFR BLD: 2.4 % (ref 0.5–7.8)
ERYTHROCYTE [DISTWIDTH] IN BLOOD BY AUTOMATED COUNT: 14 % (ref 11.9–14.6)
GLOBULIN SER CALC-MCNC: 4.4 G/DL (ref 2.3–3.5)
GLUCOSE SERPL-MCNC: 107 MG/DL (ref 70–99)
HCT VFR BLD AUTO: 47.3 % (ref 35.8–46.3)
HGB BLD-MCNC: 15.1 G/DL (ref 11.7–15.4)
IMM GRANULOCYTES # BLD AUTO: 0.02 K/UL (ref 0–0.5)
IMM GRANULOCYTES NFR BLD AUTO: 0.2 % (ref 0–5)
LYMPHOCYTES # BLD: 1.4 K/UL (ref 0.5–4.6)
LYMPHOCYTES NFR BLD: 13 % (ref 13–44)
MCH RBC QN AUTO: 28.7 PG (ref 26.1–32.9)
MCHC RBC AUTO-ENTMCNC: 31.9 G/DL (ref 31.4–35)
MCV RBC AUTO: 89.8 FL (ref 82–102)
MONOCYTES # BLD: 1.03 K/UL (ref 0.1–1.3)
MONOCYTES NFR BLD: 9.6 % (ref 4–12)
NEUTS SEG # BLD: 8.01 K/UL (ref 1.7–8.2)
NEUTS SEG NFR BLD: 74.2 % (ref 43–78)
NRBC # BLD: 0 K/UL (ref 0–0.2)
PLATELET # BLD AUTO: 281 K/UL (ref 150–450)
PMV BLD AUTO: 9.3 FL (ref 9.4–12.3)
POTASSIUM SERPL-SCNC: 4.2 MMOL/L (ref 3.5–5.1)
PROT SERPL-MCNC: 8.1 G/DL (ref 6.3–8.2)
RBC # BLD AUTO: 5.27 M/UL (ref 4.05–5.2)
SODIUM SERPL-SCNC: 140 MMOL/L (ref 136–145)
WBC # BLD AUTO: 10.8 K/UL (ref 4.3–11.1)

## 2025-02-11 PROCEDURE — 82378 CARCINOEMBRYONIC ANTIGEN: CPT

## 2025-02-11 PROCEDURE — 36415 COLL VENOUS BLD VENIPUNCTURE: CPT

## 2025-02-11 PROCEDURE — 80053 COMPREHEN METABOLIC PANEL: CPT

## 2025-02-11 PROCEDURE — 85025 COMPLETE CBC W/AUTO DIFF WBC: CPT

## 2025-02-11 ASSESSMENT — PATIENT HEALTH QUESTIONNAIRE - PHQ9
2. FEELING DOWN, DEPRESSED OR HOPELESS: NOT AT ALL
SUM OF ALL RESPONSES TO PHQ9 QUESTIONS 1 & 2: 0
SUM OF ALL RESPONSES TO PHQ QUESTIONS 1-9: 0
SUM OF ALL RESPONSES TO PHQ QUESTIONS 1-9: 0
1. LITTLE INTEREST OR PLEASURE IN DOING THINGS: NOT AT ALL
SUM OF ALL RESPONSES TO PHQ QUESTIONS 1-9: 0
SUM OF ALL RESPONSES TO PHQ QUESTIONS 1-9: 0

## 2025-02-11 NOTE — PROGRESS NOTES
with PT [facility]     Muscle Right Arm Weakness - Reports injuring her right shoulder about 3 years ago while walking in the pool. Has been managing and treating the discomfort. Now exacerbating with pain, finds some difficulty with lifting arm past a point and also with any heavy item lifting.      Xray imaging to C spine and right shoulder as there are some concerns for radiculopathy symptoms. Patient also appears to have exaggerated lordosis which may be contributory. Referral to physical therapy for consideration.      A referral has been placed with [facility] for a Medical Oncology transfer of care, consultation, and treatment.     ([facility])                                          Review of Systems:  Constitutional Denies fever or chills.  Denies weight loss or appetite changes. Denies fatigue. Denies anorexia.   HEENT Denies trauma, bluring vision, hearing loss, ear pain, nosebleeds, sore throat, neck pain and ear discharge.    Skin Denies lesions or rashes.   Lungs Denies shortness of breath, cough, sputum production or hemoptysis.   Cardiovascular Denies chest pain, palpitations, orthopnea, claudication and leg swelling.   Gastrointestinal Denies nausea, vomiting, bowel changes.  Denies bloody or black stools. Denies abdominal pain.    Denies dysuria, frequency or hesitancy of urination   Neuro Denies headaches, visual changes or ataxia. Denies dizziness, tingling, tremors, sensory change, speech change, focal weakness and headaches.     Hematology Denies nasal/gum bleeding, denies easy bruise   Endo Denies heat/cold intolerance, denies diabetes.   MSK Denies back pain, swollen legs, myalgias and falls.     Psychiatric/Behavioral Denies depression and substance abuse. The patient is not nervous/anxious.       No Known Allergies  Past Medical History:   Diagnosis Date    Colon cancer metastasized to liver (HCC) 2012 July of 2014 patient is in remission     History reviewed. No pertinent

## 2025-02-11 NOTE — PATIENT INSTRUCTIONS
Patient Information from Today's Visit    The members of your [facility] are listed below:    Physician Provider: Antony Stallings Medical Oncologist  Advanced Practice Clinician: Aury Linda NP  Registered Nurse: Simi YUEN   Navigator:   Medical Assistant: Huyen GALVEZ MA  : yLdia URRUTIA   Supportive Care Services: Moshe MOORE LMSW    Diagnosis: History of colon cancer      Follow Up Instructions:   Reviewed labs  Cea borderline  CT in 6m  Treatment Summary has been discussed and given to patient:      Current Labs:   Hospital Outpatient Visit on 02/11/2025   Component Date Value Ref Range Status    Sodium 02/11/2025 140  136 - 145 mmol/L Final    Potassium 02/11/2025 4.2  3.5 - 5.1 mmol/L Final    Chloride 02/11/2025 103  98 - 107 mmol/L Final    CO2 02/11/2025 23  20 - 29 mmol/L Final    Anion Gap 02/11/2025 14  7 - 16 mmol/L Final    Glucose 02/11/2025 107 (H)  70 - 99 mg/dL Final    Comment: <70 mg/dL Consistent with, but not fully diagnostic of hypoglycemia.  100 - 125 mg/dL Impaired fasting glucose/consistent with pre-diabetes mellitus.  > 126 mg/dl Fasting glucose consistent with overt diabetes mellitus      BUN 02/11/2025 39 (H)  8 - 23 MG/DL Final    Creatinine 02/11/2025 0.88  0.60 - 1.10 MG/DL Final    Est, Glom Filt Rate 02/11/2025 64  >60 ml/min/1.73m2 Final    Comment:    Pediatric calculator link: https://www.kidney.org/professionals/kdoqi/gfr_calculatorped     These results are not intended for use in patients <18 years of age.     eGFR results are calculated without a race factor using  the 2021 CKD-EPI equation. Careful clinical correlation is recommended, particularly when comparing to results calculated using previous equations.  The CKD-EPI equation is less accurate in patients with extremes of muscle mass, extra-renal metabolism of creatinine, excessive creatine ingestion, or following therapy that affects renal tubular secretion.      Calcium 02/11/2025 9.1  8.8 - 10.2

## 2025-02-17 ENCOUNTER — TELEPHONE (OUTPATIENT)
Dept: FAMILY MEDICINE CLINIC | Facility: CLINIC | Age: 89
End: 2025-02-17

## 2025-02-17 DIAGNOSIS — E11.9 DIET-CONTROLLED TYPE 2 DIABETES MELLITUS (HCC): Primary | ICD-10-CM

## 2025-02-17 DIAGNOSIS — E78.5 HYPERLIPIDEMIA LDL GOAL <70: ICD-10-CM

## 2025-02-19 ENCOUNTER — HOSPITAL ENCOUNTER (OUTPATIENT)
Dept: NUTRITION | Age: 89
Discharge: HOME | End: 2025-02-22
Payer: MEDICARE

## 2025-02-19 PROCEDURE — 97803 MED NUTRITION INDIV SUBSEQ: CPT

## 2025-02-19 NOTE — PROGRESS NOTES
Qi Victoria, MS VANDANA KAUR, [facility]  Outpatient Registered Dietitian  [facility]  Phone: [phone] Fax: [phone]        Pt is a 88 y.o. female referred with the following diagnosis (es): Type 2 diabetes mellitus without complications [E11.9] hemicolectomy in 2011 2/19/2025: Follow up nutrition assessment  Luisa comes in to follow up on her nutrition goals  Written fasting blood sugars range from 81-107mg/dL. States that she feels her eyes get blurry when her BG is 120mg/dL.  Annual wellness visit in April 2025.    Weight trend  2/19/2025: 109 pounds. (Stable)  12/11/2024: 109 pounds  10/2/2024: 107.8 pounds  8/19/2024: 109 pounds    Nutrition Recall:   Tracking meals/snacks/beverages for 1 month  - consuming 3 meals and 1-2 snacks daily.  -consistently consuming 1/2 ensure daily.  - using 15g carbohydrate/7g protein snack list to build healthy snacks.   - inadequate total calorie intake to support weight gain goals.  - consuming excess carbohydrate intake related to food preferences- breads, jelly, pizza, iced tea  - as of last month health insurance is providing $141 for food assistance which has helped her tremendously.   - recently purchased a crock pot.     Barriers to meeting nutrition goals:  - dislikes cooking   - finds foods lack flavor.    Intervention 30 min:  -reviewed food recall, provided suggestions on how to increase healthy fat in meals/snacks  - seasoning options provided  - recommend switching to full fat lactose free milk  - written recommendations and recipes provided today.    Recommendations:   Use MyPlate guide to compose structured meals (1/2 plate non starchy vegetables, 1/4 plate lean protein, 1/4 plate starchy vegetable or whole grain).    30g fiber rich carbohydrates 15-20g protein per meal-  15g carbohydrates, 7-10g protein per snack.    Follow up after wellness visit in 3 months.    MS VANDANA Mitchell, [facility]    12/11/2024 Follow up

## 2025-04-03 ENCOUNTER — LAB (OUTPATIENT)
Dept: FAMILY MEDICINE CLINIC | Facility: CLINIC | Age: 89
End: 2025-04-03

## 2025-04-03 DIAGNOSIS — E11.9 DIET-CONTROLLED TYPE 2 DIABETES MELLITUS (HCC): ICD-10-CM

## 2025-04-03 DIAGNOSIS — E55.9 VITAMIN D DEFICIENCY: ICD-10-CM

## 2025-04-03 DIAGNOSIS — E78.5 HYPERLIPIDEMIA LDL GOAL <70: ICD-10-CM

## 2025-04-03 LAB
25(OH)D3 SERPL-MCNC: 31.2 NG/ML (ref 30–100)
ALBUMIN SERPL-MCNC: 3.3 G/DL (ref 3.2–4.6)
ALBUMIN/GLOB SERPL: 0.8 (ref 1–1.9)
ALP SERPL-CCNC: 97 U/L (ref 35–104)
ALT SERPL-CCNC: 48 U/L (ref 8–45)
ANION GAP SERPL CALC-SCNC: 9 MMOL/L (ref 7–16)
AST SERPL-CCNC: 37 U/L (ref 15–37)
BASOPHILS # BLD: 0.06 K/UL (ref 0–0.2)
BASOPHILS NFR BLD: 0.7 % (ref 0–2)
BILIRUB SERPL-MCNC: 0.3 MG/DL (ref 0–1.2)
BUN SERPL-MCNC: 29 MG/DL (ref 8–23)
CALCIUM SERPL-MCNC: 9.4 MG/DL (ref 8.8–10.2)
CHLORIDE SERPL-SCNC: 105 MMOL/L (ref 98–107)
CHOLEST SERPL-MCNC: 173 MG/DL (ref 0–200)
CO2 SERPL-SCNC: 27 MMOL/L (ref 20–29)
CREAT SERPL-MCNC: 0.87 MG/DL (ref 0.6–1.1)
CREAT UR-MCNC: 91.9 MG/DL (ref 28–217)
DIFFERENTIAL METHOD BLD: ABNORMAL
EOSINOPHIL # BLD: 0.33 K/UL (ref 0–0.8)
EOSINOPHIL NFR BLD: 4.1 % (ref 0.5–7.8)
ERYTHROCYTE [DISTWIDTH] IN BLOOD BY AUTOMATED COUNT: 14.2 % (ref 11.9–14.6)
EST. AVERAGE GLUCOSE BLD GHB EST-MCNC: 127 MG/DL
GLOBULIN SER CALC-MCNC: 4 G/DL (ref 2.3–3.5)
GLUCOSE SERPL-MCNC: 86 MG/DL (ref 70–99)
HBA1C MFR BLD: 6.1 % (ref 0–5.6)
HCT VFR BLD AUTO: 46.2 % (ref 35.8–46.3)
HDLC SERPL-MCNC: 58 MG/DL (ref 40–60)
HDLC SERPL: 3 (ref 0–5)
HGB BLD-MCNC: 14.4 G/DL (ref 11.7–15.4)
IMM GRANULOCYTES # BLD AUTO: 0.02 K/UL (ref 0–0.5)
IMM GRANULOCYTES NFR BLD AUTO: 0.2 % (ref 0–5)
LDLC SERPL CALC-MCNC: 96 MG/DL (ref 0–100)
LYMPHOCYTES # BLD: 1.96 K/UL (ref 0.5–4.6)
LYMPHOCYTES NFR BLD: 24.1 % (ref 13–44)
MCH RBC QN AUTO: 28.7 PG (ref 26.1–32.9)
MCHC RBC AUTO-ENTMCNC: 31.2 G/DL (ref 31.4–35)
MCV RBC AUTO: 92.2 FL (ref 82–102)
MICROALBUMIN UR-MCNC: 11.8 MG/DL (ref 0–20)
MICROALBUMIN/CREAT UR-RTO: 128 MG/G (ref 0–30)
MONOCYTES # BLD: 0.66 K/UL (ref 0.1–1.3)
MONOCYTES NFR BLD: 8.1 % (ref 4–12)
NEUTS SEG # BLD: 5.11 K/UL (ref 1.7–8.2)
NEUTS SEG NFR BLD: 62.8 % (ref 43–78)
NRBC # BLD: 0 K/UL (ref 0–0.2)
PLATELET # BLD AUTO: 274 K/UL (ref 150–450)
PMV BLD AUTO: 9.9 FL (ref 9.4–12.3)
POTASSIUM SERPL-SCNC: 4.2 MMOL/L (ref 3.5–5.1)
PROT SERPL-MCNC: 7.3 G/DL (ref 6.3–8.2)
RBC # BLD AUTO: 5.01 M/UL (ref 4.05–5.2)
SODIUM SERPL-SCNC: 141 MMOL/L (ref 136–145)
TRIGL SERPL-MCNC: 96 MG/DL (ref 0–150)
TSH W FREE THYROID IF ABNORMAL: 1.54 UIU/ML (ref 0.27–4.2)
VLDLC SERPL CALC-MCNC: 19 MG/DL (ref 6–23)
WBC # BLD AUTO: 8.1 K/UL (ref 4.3–11.1)

## 2025-04-10 ENCOUNTER — OFFICE VISIT (OUTPATIENT)
Dept: FAMILY MEDICINE CLINIC | Facility: CLINIC | Age: 89
End: 2025-04-10

## 2025-04-10 VITALS
SYSTOLIC BLOOD PRESSURE: 102 MMHG | DIASTOLIC BLOOD PRESSURE: 68 MMHG | BODY MASS INDEX: 21.01 KG/M2 | HEIGHT: 60 IN | WEIGHT: 107 LBS

## 2025-04-10 DIAGNOSIS — J30.89 OTHER ALLERGIC RHINITIS: ICD-10-CM

## 2025-04-10 DIAGNOSIS — E55.9 VITAMIN D DEFICIENCY: ICD-10-CM

## 2025-04-10 DIAGNOSIS — E78.5 HYPERLIPIDEMIA LDL GOAL <70: ICD-10-CM

## 2025-04-10 DIAGNOSIS — E83.42 HYPOMAGNESEMIA: ICD-10-CM

## 2025-04-10 DIAGNOSIS — I73.9 CLAUDICATION OF BOTH LOWER EXTREMITIES: ICD-10-CM

## 2025-04-10 DIAGNOSIS — E11.9 DIET-CONTROLLED TYPE 2 DIABETES MELLITUS (HCC): Primary | ICD-10-CM

## 2025-04-10 RX ORDER — AZELASTINE 1 MG/ML
1 SPRAY, METERED NASAL 2 TIMES DAILY
Qty: 60 ML | Refills: 1 | Status: SHIPPED | OUTPATIENT
Start: 2025-04-10

## 2025-04-10 RX ORDER — FLUTICASONE PROPIONATE 50 MCG
2 SPRAY, SUSPENSION (ML) NASAL DAILY
Qty: 16 G | Refills: 0 | Status: SHIPPED | OUTPATIENT
Start: 2025-04-10

## 2025-04-10 RX ORDER — GUAIFENESIN 600 MG/1
600 TABLET, EXTENDED RELEASE ORAL 2 TIMES DAILY
Qty: 30 TABLET | Refills: 0 | Status: SHIPPED | OUTPATIENT
Start: 2025-04-10 | End: 2025-04-25

## 2025-04-10 RX ORDER — LOTEPREDNOL ETABONATE 3.8 MG/G
GEL OPHTHALMIC
COMMUNITY
Start: 2025-03-11

## 2025-04-10 RX ORDER — LORATADINE 10 MG/1
10 TABLET ORAL DAILY
Qty: 30 TABLET | Refills: 0 | Status: SHIPPED | OUTPATIENT
Start: 2025-04-10

## 2025-04-10 SDOH — ECONOMIC STABILITY: FOOD INSECURITY: WITHIN THE PAST 12 MONTHS, THE FOOD YOU BOUGHT JUST DIDN'T LAST AND YOU DIDN'T HAVE MONEY TO GET MORE.: NEVER TRUE

## 2025-04-10 NOTE — PROGRESS NOTES
PROGRESS NOTE    Chief Complaint   Patient presents with    Follow-up       SUBJECTIVE:     Luisa Contreras is a very pleasant and younger appearing 89 y.o. female with hx of diabetes, hyperlipidemia, colon cancer but now resolved-following oncology,  seen today in office for lab results review and follow-up.  Patient continues to do very very well with her dietary modification.  She continues to be very active and participate in many activities throughout the week.  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  She reports she will be having Mohs surgery with derm.      Patient also reports having sinus congestion and some sputum.  She recently had a cold about 2 weeks prior but still having some residual symptoms.  No fever or chills however.  Patient reports no chest pain, no shortness of breath, no unilateral or focal weakness, no orthopnea or PND.  GI and  review of systems is unremarkable.       Past Medical History, Past Surgical History, Family history, Social History, and Medications were all reviewed with the patient today and updated as necessary.       Current Outpatient Medications   Medication Sig Dispense Refill    LOTEMAX SM 0.38 % GEL INSTILL ONE DROP INTO EACH EYE TWICE DAILY      loratadine (CLARITIN) 10 MG tablet Take 1 tablet by mouth daily For allergies 30 tablet 0    fluticasone (FLONASE) 50 MCG/ACT nasal spray 2 sprays by Each Nostril route daily 16 g 0    guaiFENesin (MUCINEX) 600 MG extended release tablet Take 1 tablet by mouth 2 times daily for 15 days 30 tablet 0    azelastine (ASTELIN) 0.1 % nasal spray 1 spray by Nasal route 2 times daily Use in each nostril as directed 60 mL 1    glucose monitoring kit 1 kit by Does not apply route daily 1 kit 0    Lancets MISC 1 each by Does not apply route daily 100 each 5    latanoprost (XALATAN) 0.005 % ophthalmic solution Place 1 drop into both eyes daily      Dorzolamide

## 2025-04-10 NOTE — PATIENT INSTRUCTIONS
Please use Claritin non drowsy daily for allergies.    Use Flonase nasal spray. Add Mucinex over the counter for sputum. If no better, use Astelin nose spray with Claritin, Flonase.    Use Warm Compress to Jaw to help with pain and discomfort.     May take Tylenol as needed for arthritis and jaw pain.    Radiology will call to schedule ultrasound of legs to check blood flow. Their phone number is [phone] if they do not call you, please call them    Use compression socks when you are on your feet and are not able to prop your legs up.

## 2025-04-17 ENCOUNTER — RESULTS FOLLOW-UP (OUTPATIENT)
Dept: FAMILY MEDICINE CLINIC | Facility: CLINIC | Age: 89
End: 2025-04-17

## 2025-05-06 ENCOUNTER — HOSPITAL ENCOUNTER (OUTPATIENT)
Dept: NUTRITION | Age: 89
Setting detail: RECURRING SERIES
Discharge: HOME | End: 2025-05-09
Payer: MEDICARE

## 2025-05-06 PROCEDURE — 97803 MED NUTRITION INDIV SUBSEQ: CPT

## 2025-05-06 NOTE — PROGRESS NOTES
Qi Victoria, MS RD LD, [facility]  Outpatient Registered Dietitian  [facility]  Phone: [phone] Fax: [phone]        Pt is a 88 y.o. female referred with the following diagnosis (es): Type 2 diabetes mellitus without complications [E11.9] hemicolectomy in 2011 5/6/2025  Luisa comes in to follow up on her nutrition goals.  Saw PCP last month. Reports she was dx with squamous cell to left lower leg, s/p Mohs surgery, waiting to be cleared to resume exercise on this leg.  Maintains consuming smaller frequent meals, consuming 1 ensure plus daily, receiving support for [employer] [employer] for groceries, and health care services at home which has been very helpful to her.  Family has been supporting her with her doctors visits.   Labs: 4/3/2025: A1C= 6.1, stable 6.0 on  9/23/2024  4/3/2025 Vit D 31.2       Weight trend  5/6/2025: 108.9 pounds (stable)  2/19/2025: 109 pounds. (Stable)  12/11/2024: 109 pounds  10/2/2024: 107.8 pounds  8/19/2024: 109 pounds    Nutrition Recall:   Tracking meals/snacks/beverages for 1 month.  - fasting blood sugars range between 90's-114. Finds her blood sugars are higher during stressful time.s  - consuming 3 meals and 1-2 snacks daily.  -consistently consuming 1 ensure daily.   - using 15g carbohydrate/7g protein snack list to build healthy snacks.   - inadequate total calorie intake to support weight gain goals. States she is content and not going to stress about gaining weight.  - consuming low fat cottege cheese    Intervention (30min) :  -advised consuming full fat cottage cheese, lactose free option shown and written today.   -additional fat sources provided today  - we discussed using inbody assessment next visit- agreeable to this plan.    Goals:  - continue to consume 6 balanced mini meals  - continue to monitor fasting blood glucose 1-2x per week.  - Continue consuming 1 ensure plus daily.   -increase fat intake in low in lactose or lactose free dairy.   -

## 2025-07-09 ENCOUNTER — HOSPITAL ENCOUNTER (OUTPATIENT)
Dept: NUTRITION | Age: 89
Setting detail: RECURRING SERIES
Discharge: HOME | End: 2025-07-12
Payer: MEDICARE

## 2025-07-09 PROCEDURE — 97803 MED NUTRITION INDIV SUBSEQ: CPT

## 2025-07-09 NOTE — PROGRESS NOTES
for 30 minutes of movement everyday.      Nutrition Education and Counseling (65 minutes):  Reviewed MNT Guidelines for DM weight stabilization .   Discussed effects of foods/beverages on metabolic parameters (blood pressure, glucose, lipids, blood pressure and weight). Encouraged structured eating: planning ahead for macronutrient balanced meals and snacks, consistent CHO intake according to prescribed meal pattern, not skipping meals, and designated times for meals/snacks.    Encouraged reducing overall carbohydrate intake from baseline, choosing low glycemic, fiber rich foods and complex carbohydrate sources, lean protein sources, and heart healthy fats, and limiting treat foods to 2x a week or less.   Reviewed food sources of CHO, lean protein, and heart healthy fats.   Assessed and Individualized nutrient recommendations.   Consume 3 meals and 2-3 snacks daily  Aim for 30g fiber rich carbohydrates per meal + 15 grams protein  Aim for 15-20g fiber rich carbohydrates per snack + 7g protein  Resources to assist in food choices- Collaborated with pt to create sample meals/snacks using myplate method.               Materials Provided and Discussed:  Meal planning, CHO 15g food list, ADA blood sugars goals, 15/15 rule for hypoglycemia.      Patient verbalized understanding of recommendations discussed.  Goal:   Action Steps:   Use MyPlate guide to compose structured meals (1/2 plate non starchy vegetables, 1/4 plate lean protein, 1/4 plate starchy vegetable or whole grain).  30g fiber rich carbohydrates 15-20g protein per meal  Ensure as 1 snack per day.     Track meals for 7 days. Check and record fasting, and 2hr PPBG during this time.  Fasting aim for  given advanced age; 1-2 hours 130-150 post prandial.   Aim for 30 minutes of movement everyday-- for 10 minutes after each meal is most beneficial on glycemic control.              Instructed to call RD office if fasting BG <80 or >200 for several reading in a

## 2025-08-15 DIAGNOSIS — E78.5 HYPERLIPIDEMIA LDL GOAL <70: ICD-10-CM

## 2025-08-15 DIAGNOSIS — E83.42 HYPOMAGNESEMIA: ICD-10-CM

## 2025-08-15 DIAGNOSIS — E11.9 DIET-CONTROLLED TYPE 2 DIABETES MELLITUS (HCC): Primary | ICD-10-CM

## 2025-08-15 DIAGNOSIS — E55.9 VITAMIN D DEFICIENCY: ICD-10-CM

## 2025-08-19 ENCOUNTER — LAB (OUTPATIENT)
Dept: FAMILY MEDICINE CLINIC | Facility: CLINIC | Age: 89
End: 2025-08-19
Payer: MEDICARE

## 2025-08-19 DIAGNOSIS — E83.42 HYPOMAGNESEMIA: ICD-10-CM

## 2025-08-19 DIAGNOSIS — E11.9 DIET-CONTROLLED TYPE 2 DIABETES MELLITUS (HCC): ICD-10-CM

## 2025-08-19 DIAGNOSIS — E55.9 VITAMIN D DEFICIENCY: ICD-10-CM

## 2025-08-19 LAB
25(OH)D3 SERPL-MCNC: 33.8 NG/ML (ref 30–100)
ALBUMIN SERPL-MCNC: 3.6 G/DL (ref 3.2–4.6)
ALBUMIN/GLOB SERPL: 1 (ref 1–1.9)
ALP SERPL-CCNC: 81 U/L (ref 35–104)
ALT SERPL-CCNC: 34 U/L (ref 8–45)
ANION GAP SERPL CALC-SCNC: 9 MMOL/L (ref 7–16)
AST SERPL-CCNC: 31 U/L (ref 15–37)
BASOPHILS # BLD: 0.07 K/UL (ref 0–0.2)
BASOPHILS NFR BLD: 0.9 % (ref 0–2)
BILIRUB SERPL-MCNC: 0.5 MG/DL (ref 0–1.2)
BILIRUBIN, URINE, POC: NEGATIVE
BLOOD URINE, POC: ABNORMAL
BUN SERPL-MCNC: 28 MG/DL (ref 8–23)
CALCIUM SERPL-MCNC: 9.7 MG/DL (ref 8.8–10.2)
CHLORIDE SERPL-SCNC: 103 MMOL/L (ref 98–107)
CHOLEST SERPL-MCNC: 181 MG/DL (ref 0–200)
CO2 SERPL-SCNC: 26 MMOL/L (ref 20–29)
CREAT SERPL-MCNC: 0.76 MG/DL (ref 0.6–1.1)
CREAT UR-MCNC: 53.5 MG/DL (ref 28–217)
DIFFERENTIAL METHOD BLD: NORMAL
EOSINOPHIL # BLD: 0.4 K/UL (ref 0–0.8)
EOSINOPHIL NFR BLD: 5.4 % (ref 0.5–7.8)
ERYTHROCYTE [DISTWIDTH] IN BLOOD BY AUTOMATED COUNT: 14 % (ref 11.9–14.6)
EST. AVERAGE GLUCOSE BLD GHB EST-MCNC: 124 MG/DL
GLOBULIN SER CALC-MCNC: 3.7 G/DL (ref 2.3–3.5)
GLUCOSE SERPL-MCNC: 107 MG/DL (ref 70–99)
GLUCOSE URINE, POC: NEGATIVE
HBA1C MFR BLD: 6 % (ref 0–5.6)
HCT VFR BLD AUTO: 44.2 % (ref 35.8–46.3)
HDLC SERPL-MCNC: 65 MG/DL (ref 40–60)
HDLC SERPL: 2.8 (ref 0–5)
HGB BLD-MCNC: 14.3 G/DL (ref 11.7–15.4)
IMM GRANULOCYTES # BLD AUTO: 0.02 K/UL (ref 0–0.5)
IMM GRANULOCYTES NFR BLD AUTO: 0.3 % (ref 0–5)
KETONES, URINE, POC: NEGATIVE
LDLC SERPL CALC-MCNC: 101 MG/DL (ref 0–100)
LEUKOCYTE ESTERASE, URINE, POC: ABNORMAL
LYMPHOCYTES # BLD: 1.77 K/UL (ref 0.5–4.6)
LYMPHOCYTES NFR BLD: 24 % (ref 13–44)
MAGNESIUM SERPL-MCNC: 2.1 MG/DL (ref 1.8–2.4)
MCH RBC QN AUTO: 29.7 PG (ref 26.1–32.9)
MCHC RBC AUTO-ENTMCNC: 32.4 G/DL (ref 31.4–35)
MCV RBC AUTO: 91.9 FL (ref 82–102)
MICROALBUMIN UR-MCNC: 5.45 MG/DL (ref 0–20)
MICROALBUMIN/CREAT UR-RTO: 102 MG/G (ref 0–30)
MONOCYTES # BLD: 0.81 K/UL (ref 0.1–1.3)
MONOCYTES NFR BLD: 11 % (ref 4–12)
NEUTS SEG # BLD: 4.3 K/UL (ref 1.7–8.2)
NEUTS SEG NFR BLD: 58.4 % (ref 43–78)
NITRITE, URINE, POC: NEGATIVE
NRBC # BLD: 0 K/UL (ref 0–0.2)
PH, URINE, POC: 6 (ref 4.6–8)
PLATELET # BLD AUTO: 252 K/UL (ref 150–450)
PMV BLD AUTO: 9.9 FL (ref 9.4–12.3)
POTASSIUM SERPL-SCNC: 4.3 MMOL/L (ref 3.5–5.1)
PROT SERPL-MCNC: 7.2 G/DL (ref 6.3–8.2)
PROTEIN,URINE, POC: NEGATIVE
RBC # BLD AUTO: 4.81 M/UL (ref 4.05–5.2)
SODIUM SERPL-SCNC: 138 MMOL/L (ref 136–145)
SPECIFIC GRAVITY, URINE, POC: 1.01 (ref 1–1.03)
TRIGL SERPL-MCNC: 73 MG/DL (ref 0–150)
TSH W FREE THYROID IF ABNORMAL: 2.07 UIU/ML (ref 0.27–4.2)
URINALYSIS CLARITY, POC: ABNORMAL
URINALYSIS COLOR, POC: YELLOW
UROBILINOGEN, POC: NORMAL
VLDLC SERPL CALC-MCNC: 15 MG/DL (ref 6–23)
WBC # BLD AUTO: 7.4 K/UL (ref 4.3–11.1)

## 2025-08-19 PROCEDURE — 81003 URINALYSIS AUTO W/O SCOPE: CPT | Performed by: NURSE PRACTITIONER

## 2025-08-26 ENCOUNTER — TELEPHONE (OUTPATIENT)
Dept: ONCOLOGY | Age: 89
End: 2025-08-26

## 2025-08-26 ENCOUNTER — OFFICE VISIT (OUTPATIENT)
Dept: FAMILY MEDICINE CLINIC | Facility: CLINIC | Age: 89
End: 2025-08-26
Payer: MEDICARE

## 2025-08-26 VITALS
SYSTOLIC BLOOD PRESSURE: 118 MMHG | DIASTOLIC BLOOD PRESSURE: 76 MMHG | OXYGEN SATURATION: 95 % | BODY MASS INDEX: 21.2 KG/M2 | HEIGHT: 60 IN | WEIGHT: 108 LBS | HEART RATE: 81 BPM

## 2025-08-26 DIAGNOSIS — Z00.00 ENCOUNTER FOR ANNUAL WELLNESS VISIT (AWV) IN MEDICARE PATIENT: Primary | ICD-10-CM

## 2025-08-26 DIAGNOSIS — Z85.828 HISTORY OF SKIN CANCER: ICD-10-CM

## 2025-08-26 DIAGNOSIS — Z85.038 HISTORY OF COLON CANCER: ICD-10-CM

## 2025-08-26 DIAGNOSIS — E11.9 DIET-CONTROLLED TYPE 2 DIABETES MELLITUS (HCC): ICD-10-CM

## 2025-08-26 DIAGNOSIS — E55.9 VITAMIN D DEFICIENCY: ICD-10-CM

## 2025-08-26 DIAGNOSIS — E78.5 HYPERLIPIDEMIA LDL GOAL <70: ICD-10-CM

## 2025-08-26 PROCEDURE — G0439 PPPS, SUBSEQ VISIT: HCPCS | Performed by: NURSE PRACTITIONER

## 2025-08-26 PROCEDURE — 1123F ACP DISCUSS/DSCN MKR DOCD: CPT | Performed by: NURSE PRACTITIONER

## 2025-08-26 PROCEDURE — G8420 CALC BMI NORM PARAMETERS: HCPCS | Performed by: NURSE PRACTITIONER

## 2025-08-26 PROCEDURE — 1090F PRES/ABSN URINE INCON ASSESS: CPT | Performed by: NURSE PRACTITIONER

## 2025-08-26 PROCEDURE — 3044F HG A1C LEVEL LT 7.0%: CPT | Performed by: NURSE PRACTITIONER

## 2025-08-26 PROCEDURE — 1036F TOBACCO NON-USER: CPT | Performed by: NURSE PRACTITIONER

## 2025-08-26 PROCEDURE — G8427 DOCREV CUR MEDS BY ELIG CLIN: HCPCS | Performed by: NURSE PRACTITIONER

## 2025-08-26 PROCEDURE — 1160F RVW MEDS BY RX/DR IN RCRD: CPT | Performed by: NURSE PRACTITIONER

## 2025-08-26 PROCEDURE — 1159F MED LIST DOCD IN RCRD: CPT | Performed by: NURSE PRACTITIONER

## 2025-08-26 PROCEDURE — G2211 COMPLEX E/M VISIT ADD ON: HCPCS | Performed by: NURSE PRACTITIONER

## 2025-08-26 PROCEDURE — 99214 OFFICE O/P EST MOD 30 MIN: CPT | Performed by: NURSE PRACTITIONER

## 2025-08-26 RX ORDER — CEPHALEXIN 500 MG/1
500 CAPSULE ORAL 4 TIMES DAILY PRN
COMMUNITY
Start: 2025-08-18

## 2025-08-26 SDOH — ECONOMIC STABILITY: FOOD INSECURITY: WITHIN THE PAST 12 MONTHS, THE FOOD YOU BOUGHT JUST DIDN'T LAST AND YOU DIDN'T HAVE MONEY TO GET MORE.: NEVER TRUE

## 2025-08-26 SDOH — ECONOMIC STABILITY: FOOD INSECURITY: WITHIN THE PAST 12 MONTHS, YOU WORRIED THAT YOUR FOOD WOULD RUN OUT BEFORE YOU GOT MONEY TO BUY MORE.: NEVER TRUE

## 2025-08-26 ASSESSMENT — PATIENT HEALTH QUESTIONNAIRE - PHQ9
SUM OF ALL RESPONSES TO PHQ QUESTIONS 1-9: 0
1. LITTLE INTEREST OR PLEASURE IN DOING THINGS: NOT AT ALL
2. FEELING DOWN, DEPRESSED OR HOPELESS: NOT AT ALL

## 2025-08-27 ASSESSMENT — ENCOUNTER SYMPTOMS
APNEA: 0
EYE ITCHING: 0
BLOOD IN STOOL: 0
SINUS PRESSURE: 0
VOMITING: 0
ANAL BLEEDING: 0
RHINORRHEA: 0
TROUBLE SWALLOWING: 0
WHEEZING: 0
ALLERGIC/IMMUNOLOGIC NEGATIVE: 1
SORE THROAT: 0
GASTROINTESTINAL NEGATIVE: 1
SINUS PAIN: 0
CONSTIPATION: 0
DIARRHEA: 0
FACIAL SWELLING: 0
PHOTOPHOBIA: 0
COLOR CHANGE: 0
COUGH: 0
CHEST TIGHTNESS: 0
STRIDOR: 0
EYE REDNESS: 0
EYE DISCHARGE: 0
NAUSEA: 0
EYE PAIN: 0
CHOKING: 0
ABDOMINAL DISTENTION: 0
RECTAL PAIN: 0
ABDOMINAL PAIN: 0
SHORTNESS OF BREATH: 0
EYES NEGATIVE: 1
BACK PAIN: 0
VOICE CHANGE: 0